# Patient Record
Sex: FEMALE | URBAN - METROPOLITAN AREA
[De-identification: names, ages, dates, MRNs, and addresses within clinical notes are randomized per-mention and may not be internally consistent; named-entity substitution may affect disease eponyms.]

---

## 2021-09-07 NOTE — PROGRESS NOTES
AMBULATORY PROGRESS NOTE      Patient: Efrem Arellano             MRN: [id number]     SSN: [id number] Body mass index is 30.18 kg/m². YOB: 1980     AGE: 35 y.o. EX: female    PCP: Qing Peralta MD       IMPRESSION //  DIAGNOSIS AND TREATMENT PLAN        Sherron Arellano has a diagnosis of:      She has had distal tibia stress fracture, left side: Nondisplaced nonangulated: The MRI, shows some cortical edema in the posterior focal region of the posterior cortex. She has been weightbearing as tolerated in a short cam walker boot, ice her care is initially created by a local podiatrist.  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 Recommendation, is to use the cam walker boot, I still think the height of the short boot, still adequate, for her fracture. While at work, she is in use her discretion to stand and walk and it was written to use her discretion to sit down when needed, as she is using an ASO ankle sleeve and this has been helping her. Recommend CMP check calcium as well as vitamin D level. I think the running, was the primary factor of her developing the stress fracture, as she in the past and more recently up until she had severe pain and could not run anymore, she ran upwards to 2 to 10 miles a week, but on occasion 5 miles on one session but on alternate days. Explained of these fractures usually take about 6 to 8 weeks to heal, immediate protect her for that period of time. Plan to see her, in 3 weeks, obtain x-rays, of her left tib-fib, attention left distal tibia region specifically. DIAGNOSES    1.  Stress fracture of left tibia, initial encounter        Orders Placed This Encounter    METABOLIC PANEL, COMPREHENSIVE     Standing Status:   Future     Standing Expiration Date:   10/8/2021    VITAMIN D, 25 HYDROXY     Standing Status:   Future     Standing Expiration Date:   9/9/2022    norethindrone-ethinyl estradiol (Loestrin Fe 1/20, 28-Day,) 1 mg-20 mcg (21)/75 mg (7) tab     Sig: Loestrin Fe 1/20 (28-Day)    norethindrone ac-eth estradioL (Microgestin 1.5/30, 21,) 1.5-30 mg-mcg tab     Sig: Microgestin 1.5/30 (21) 1.5 mg-30 mcg tablet    omega-3s/dha/epa/fish oil/D3 (VITAMIN-D + OMEGA-3 PO)     Sig: Vitamin D    ascorbic acid, vitamin C, (ascorbic acid) 100 mg tab     Sig: Vitamin C    levothyroxine (SYNTHROID) 112 mcg tablet     Sig: Take 112 mcg by mouth Daily (before breakfast). PLAN:    1. Continue to modify activities: avoid running for now. 2. Continue wearing L Short CAM walker boot that was previously given to you. 3. Obtain  XR at next OV  4  Order bloodwork: CMP and Vitamin D      RTO-  F/I of bloodwork    Sherron Arellano  expresses understanding of the diagnosis, treatment plan, and all of their proposed questions were answered to their satisfaction. Patient education has been provided re the diagnoses. HPI //  OBJECTIVE EXAMINATION        Sherron Arellano IS A 35 y.o. female who is a/an  new patient, presenting to my outpatient office for evaluation of  the following chief complaint(s):     Chief Complaint   Patient presents with    Leg Pain     left, NKI, had MRI    Ankle Pain     left, NKI, had MRI       Patient presents today w/ lingering left leg/ankle pain. She states initially had left foot pain that began to radiate into her ankle/leg onset July. [facility] and had XRs and MRI completed. [facility] also gave her a short CAM walker boot , which has helped to alleviate most of her left leg/ankle pain. She started running December 2020 and stopped due to left ankle/leg pain. She averaged 2-10 miles over a period of 2-3 days each week. Mentions she runs on a trail and treadmill.     Visit Vitals  Pulse 72   Temp 97.5 °F (36.4 °C) (Skin)   Ht 5' 2\" (1.575 m)   Wt 165 lb (74.8 kg)   LMP 08/25/2021 Comment: on birth control pill   SpO2 96%   BMI 30.18 kg/m²       Appearance: Alert, well appearing and pleasant patient who is in no distress, oriented to person, place/time, and who follows commands. This patient is accompanied in the examination room by her  self. There is signs of: no dementia  Psychiatric: Affect/mood are appropriate. Speech normal in context and clarity, memory intact grossly, no involuntary movements - tremors. Patient arrives to office via: with assistive device: Short CAM walker boot  H EENT (2): Head normocephalic & atraumatic. Eye: pupils are round// EOM are intact // Neck: ROM WNL  // Hearings Intact   Respiratory: Breathing non labored     ANKLE/FOOT left    Gait: uses assistive device ( Short CAM walker boot)  Tenderness: mild to medial face of the tibia, distalward. There is mild tenderness, nonfocal palpation. Cutaneous: Mild swelling focal region anteromedial tibial face. Joint Motion: Ankle, hindfoot, forefoot joints have range of motion that are: WNL  Joint / Tendon Stability: No Ankle or Subtalar instability or joint laxity. No peroneal sublux ability or dislocation  Alignment: neutral Hindfoot,    Neuro Motor/Sensory: NL/NL  Vascular: NL foot/ankle pulses,   Lymphatics: No extremity lymphedema, No calf swelling, no tenderness to calf muscles. CHART REVIEW     Sherron Arellano has been experiencing pain and discomfort confirmed as outlined in the pain assessment outlined below.  was reviewed by Mark Avina MD on 9/8/2021. Pain Assessment  9/8/2021   Location of Pain Ankle;Leg   Location Modifiers Left   Severity of Pain 2   Quality of Pain Aching; Sharp   Duration of Pain A few minutes   Frequency of Pain Intermittent   Aggravating Factors Other (Comment)   Aggravating Factors Comment daily activity   Limiting Behavior Yes   Relieving Factors Other (Comment)   Relieving Factors Comment boot, motrin and tylenol   Result of Injury No        Sherron Arellano has a past medical history of Hypothyroid, Left ankle pain, and Left leg pain. Patients is employed at:         Past Medical History:   Diagnosis Date    Hypothyroid     hx cancer    Left ankle pain     Left leg pain      Past Surgical History:   Procedure Laterality Date    HX THYROIDECTOMY       Current Outpatient Medications   Medication Sig    norethindrone-ethinyl estradiol (Loestrin Fe 1/20, 28-Day,) 1 mg-20 mcg (21)/75 mg (7) tab Loestrin Fe 1/20 (28-Day)    norethindrone ac-eth estradioL (Microgestin 1.5/30, 21,) 1.5-30 mg-mcg tab Microgestin 1.5/30 (21) 1.5 mg-30 mcg tablet    omega-3s/dha/epa/fish oil/D3 (VITAMIN-D + OMEGA-3 PO) Vitamin D    ascorbic acid, vitamin C, (ascorbic acid) 100 mg tab Vitamin C    levothyroxine (SYNTHROID) 112 mcg tablet Take 112 mcg by mouth Daily (before breakfast). No current facility-administered medications for this visit. No Known Allergies  Social History     Occupational History    Not on file   Tobacco Use    Smoking status: Never Smoker    Smokeless tobacco: Never Used   Substance and Sexual Activity    Alcohol use: Not on file    Drug use: Not on file    Sexual activity: Yes     Partners: Male     Birth control/protection: Pill     History reviewed. No pertinent family history. DIAGNOSTIC LAB DATA      No results found for: HBA1C, WLO3LUQL, XUR3ZWGB // No results found for: GLU, GLUCPOC     No results found for: WYH9XASR, NUX4NVCM      No results found for: VITD3, XQVID2, XQVID3, XQVID, VD3RIA, [id number]      REVIEW OF SYSTEMS : 9/8/2021  ALL BELOW ARE Negative except : SEE HPI     All other systems reviewed and are negative. 12 point review of systems otherwise negative unless noted in HPI.       DIAGNOSTIC IMAGING /ORDERS       Orders Placed This Encounter    METABOLIC PANEL, COMPREHENSIVE     Standing Status:   Future     Standing Expiration Date:   10/8/2021    VITAMIN D, 25 HYDROXY     Standing Status:   Future     Standing Expiration Date:   9/9/2022    norethindrone-ethinyl estradiol (Loestrin Fe 1/20, 28-Day,) 1 mg-20 mcg (21)/75 mg (7) tab     Sig: Loestrin Fe 1/20 (28-Day)    norethindrone ac-eth estradioL (Microgestin 1.5/30, 21,) 1.5-30 mg-mcg tab     Sig: Microgestin 1.5/30 (21) 1.5 mg-30 mcg tablet    omega-3s/dha/epa/fish oil/D3 (VITAMIN-D + OMEGA-3 PO)     Sig: Vitamin D    ascorbic acid, vitamin C, (ascorbic acid) 100 mg tab     Sig: Vitamin C    levothyroxine (SYNTHROID) 112 mcg tablet     Sig: Take 112 mcg by mouth Daily (before breakfast). [facility] IMAGING : INTERPRETATION BY RADIOLOGIST     MR ANKLE WO + W IV CON LEFT    Impression      1. Deep to the marker indicating site of clinical concern is a nondisplaced subacute appearing stress fracture of the distal tibial diaphysis with associated periostitis and edema/enhancement as above. 2. Mild nonspecific patchy subcutaneous edema anteriorly overlying the anterior tibialis tendon. Signed By: Layton Ruiz MD on 8/30/2021 11:07 AM  Narrative    EXAMINATION: MR ANKLE WO + W IV CON LEFT     CLINICAL INDICATION/HISTORY: V72.994: Anterior tibialis tendonitis of left leg   M79.672: Pain in left foot    >Additional: None     TECHNIQUE sagittal T1 and STIR, axial proton-density and T2 with fat saturation, proton density axial oblique as well as coronal T2 with fat saturation. Additional T1 axial precontrast followed by postcontrast T1 fat-saturated stenosis in all 3 planes. COMPARISON EXAMS: Left ankle radiographs dated 7/13/2021     _______________     FINDINGS:     ANKLE LIGAMENTS:   Anterior and posterior inferior tibiofibular ligaments and syndesmosis: Intact   Anterior and posterior talofibular and the calcaneofibular ligaments: Intact   Deltoid and spring ligaments: Intact     ANKLE AND FOOT TENDONS:   Lateral: Peroneus longus and brevis tendons: Intact and normal in signal intensity.      Medial: Posterior tibialis, flexor digitorum longus and flexor hallucis longus tendons: Intact and normal in signal intensity. Extensor tendons: Intact and normal in signal intensity. Mild peritendinous edema is seen in the subcutaneous soft tissues overlying the anterior tibialis tendon. Achilles tendon: Achilles tendon is normal in morphology and signal intensity.       Kager's fat pad is unremarkable.  No retrocalcaneal bursitis. OSSEOUS:     Lower leg: There is focal cortical thickening with enhancing periostitis and a linear band of increased signal traversing the posterior cortex of the distal tibia (sagittal series images 11-13 and axial series images 31-34). This corresponds to the marker indicating site of clinical concern. Mild subjacent marrow edema. Tibiotalar and subtalar joints: No joint effusion. No fracture or contusion. Intact talar dome and symmetric ankle mortise. No degenerative osteoarthropathy of the ankle. Included midfoot articulations: Unremarkable. OTHER:   Sinus tarsi: Normal fat signal intensity. Plantar fascia: Intact, normal in caliber and signal intensity. Tarsal tunnel: Normal in appearance. Regional soft tissues: No abnormal soft tissue fluid collections. _______________  Other Result Text    Jl Alexandra MD - 08/30/2021   Formatting of this note might be different from the original.   EXAMINATION: MR ANKLE WO + W IV CON LEFT     CLINICAL INDICATION/HISTORY: X44.415: Anterior tibialis tendonitis of left leg   M79.672: Pain in left foot    >Additional: None     TECHNIQUE sagittal T1 and STIR, axial proton-density and T2 with fat saturation, proton density axial oblique as well as coronal T2 with fat saturation. Additional T1 axial precontrast followed by postcontrast T1 fat-saturated stenosis in all 3 planes.      COMPARISON EXAMS: Left ankle radiographs dated 7/13/2021     _______________     FINDINGS:     ANKLE LIGAMENTS:   Anterior and posterior inferior tibiofibular ligaments and syndesmosis: Intact   Anterior and posterior talofibular and the calcaneofibular ligaments: Intact   Deltoid and spring ligaments: Intact     ANKLE AND FOOT TENDONS:   Lateral: Peroneus longus and brevis tendons: Intact and normal in signal intensity. Medial: Posterior tibialis, flexor digitorum longus and flexor hallucis longus tendons: Intact and normal in signal intensity. Extensor tendons: Intact and normal in signal intensity. Mild peritendinous edema is seen in the subcutaneous soft tissues overlying the anterior tibialis tendon. Achilles tendon: Achilles tendon is normal in morphology and signal intensity.       Kager's fat pad is unremarkable.  No retrocalcaneal bursitis. OSSEOUS:     Lower leg: There is focal cortical thickening with enhancing periostitis and a linear band of increased signal traversing the posterior cortex of the distal tibia (sagittal series images 11-13 and axial series images 31-34). This corresponds to the marker indicating site of clinical concern. Mild subjacent marrow edema. Tibiotalar and subtalar joints: No joint effusion. No fracture or contusion. Intact talar dome and symmetric ankle mortise. No degenerative osteoarthropathy of the ankle. Included midfoot articulations: Unremarkable. OTHER:   Sinus tarsi: Normal fat signal intensity. Plantar fascia: Intact, normal in caliber and signal intensity. Tarsal tunnel: Normal in appearance. Regional soft tissues: No abnormal soft tissue fluid collections. _______________     IMPRESSION     1. Deep to the marker indicating site of clinical concern is a nondisplaced subacute appearing stress fracture of the distal tibial diaphysis with associated periostitis and edema/enhancement as above. 2. Mild nonspecific patchy subcutaneous edema anteriorly overlying the anterior tibialis tendon.      Signed By: Vicki Garcia MD on 8/30/2021 11:07 AM     Date: 08/30/21   Received From: [address] [place] [address] 2 VIEWS LEFT    Narrative    3 views of the left foot were taken and results reviewed with the Pt.       Interpretation: There is no evidence of acute pathology present. No   evidence of fracture or foreign body. There are degenerative changes in   the forefoot/midfoot/rearfoot. Calcaneal spurring.       Impression: No acute pathology as noted above. 2 views of the left ankle were taken and results reviewed with the Pt.       Interpretation: There is no evidence of acute pathology present. No   evidence of fracture or foreign body. There are degenerative changes in   the forefoot/midfoot/rearfoot. Calcaneal spurring.       Impression: No acute pathology as noted above. Other Result Text    This result has an attachment that is not available. Date: 07/14/21   Received From: [address] [place] [address] 3 VIEWS LEFT    Narrative    3 views of the left foot were taken and results reviewed with the Pt.       Interpretation: There is no evidence of acute pathology present. No   evidence of fracture or foreign body. There are degenerative changes in   the forefoot/midfoot/rearfoot. Calcaneal spurring.       Impression: No acute pathology as noted above. 2 views of the left ankle were taken and results reviewed with the Pt.       Interpretation: There is no evidence of acute pathology present. No   evidence of fracture or foreign body. There are degenerative changes in   the forefoot/midfoot/rearfoot. Calcaneal spurring.       Impression: No acute pathology as noted above. Other Result Text    This result has an attachment that is not available. Date: 07/14/21   Received From: [address]           I have reviewed the radiology transcribed results and I have reviewed the images of the above study.                 On this date 09/08/2021 I have spent 30 minutes reviewing previous notes, test results and face to face with the patient discussing the diagnosis and importance of compliance with the treatment plan as well as documenting on the day of the visit. An electronic signature was used to authenticate this note. Disclaimer: Sections of this note are dictated using utilizing voice recognition software, which may have resulted in some phonetic based errors in grammar and contents. Even though attempts were made to correct all the mistakes, some may have been missed, and remained in the body of the document. If questions arise, please contact our department. Sherron Arellano may have a reminder for a \"due or due soon\" health maintenance. I have asked that she contact her primary care provider for follow-up on this health maintenance.       9/8/2021  8:48 AM

## 2021-09-08 ENCOUNTER — OFFICE VISIT (OUTPATIENT)
Dept: ORTHOPEDIC SURGERY | Age: 41
End: 2021-09-08
Payer: COMMERCIAL

## 2021-09-08 VITALS
WEIGHT: 165 LBS | HEIGHT: 62 IN | OXYGEN SATURATION: 96 % | BODY MASS INDEX: 30.36 KG/M2 | HEART RATE: 72 BPM | TEMPERATURE: 97.5 F

## 2021-09-08 DIAGNOSIS — M84.362A STRESS FRACTURE OF LEFT TIBIA, INITIAL ENCOUNTER: Primary | ICD-10-CM

## 2021-09-08 PROCEDURE — 99203 OFFICE O/P NEW LOW 30 MIN: CPT | Performed by: ORTHOPAEDIC SURGERY

## 2021-09-08 PROCEDURE — 27760 CLTX MEDIAL ANKLE FX: CPT | Performed by: ORTHOPAEDIC SURGERY

## 2021-09-08 RX ORDER — LEVOTHYROXINE SODIUM 112 UG/1
112 TABLET ORAL
COMMUNITY

## 2021-09-08 RX ORDER — NORETHINDRONE ACETATE AND ETHINYL ESTRADIOL .03; 1.5 MG/1; MG/1
TABLET ORAL
COMMUNITY

## 2021-09-08 RX ORDER — NORETHINDRONE ACETATE AND ETHINYL ESTRADIOL 1MG-20(21)
KIT ORAL
COMMUNITY

## 2021-09-08 NOTE — LETTER
NOTIFICATION RETURN TO WORK / SCHOOL    9/8/2021 11:47 AM    Ms. Sherron Arellano  [address]      To Whom It May Concern:    Sherron Arellano is currently under the care of [address] [place] [address] Srinath Carrillo. Please allow Sherron Arellano to her discretion to sit at work as she is still recovering from a left distal tibia stress fracture. If there are questions or concerns please have the patient contact our office.         Sincerely,      Michael Garcia MD

## 2021-09-28 NOTE — PROGRESS NOTES
AMBULATORY PROGRESS NOTE      Patient: Charly Arellano             MRN: [id number]     SSN: [id number] Body mass index is 30 kg/m². YOB: 1980     AGE: 35 y.o. EX: female    PCP: Marvel Dumont MD       IMPRESSION //  DIAGNOSIS AND TREATMENT PLAN        Sherron Arellano has a diagnosis of:      She is getting better, overall. As such, I weaned her in the cam walker boot, and have her just use an air sport Aircast brace. Have her walk for exercise for a month, and then walk run intermittently, thereafter. It is to be recalled that she had a distal tibia stress fracture, confirmed on MRI. DIAGNOSES    1. Stress fracture of left tibia, initial encounter        No orders of the defined types were placed in this encounter. PLAN:    1. Discontinue wearing L Short CAM walker boot  2. Advised pt to resume running in 1 month. RTO-  1 month    Sherron Arellano  expresses understanding of the diagnosis, treatment plan, and all of their proposed questions were answered to their satisfaction. Patient education has been provided re the diagnoses. HPI //  OBJECTIVE EXAMINATION        Sherron Arellano IS A 35 y.o. female who is a/an  established patient, presenting to my outpatient office for evaluation of  the following chief complaint(s):     Chief Complaint   Patient presents with    Foot Pain     left foot       At LOV pt presented w/ left ankle  and left leg pain. Continue to modify activities: avoid running for now. Continue wearing L Short CAM walker boot that was previously given to you. Obtain  XR at next OV   Order bloodwork: CMP and Vitamin D. Since LOV pt presents today w/ less left foot pain. She is wearing a  L Short CAM walker boot.     Visit Vitals  Pulse 74   Temp 97.5 °F (36.4 °C) (Temporal)   Ht 5' 2\" (1.575 m)   Wt 164 lb (74.4 kg)   SpO2 98%   BMI 30.00 kg/m²       Appearance: Alert, well appearing and pleasant patient who is in no distress, oriented to person, place/time, and who follows commands. This patient is accompanied in the examination room by her  self. There is signs of: no dementia  Psychiatric: Affect/mood are appropriate. Speech normal in context and clarity, memory intact grossly, no involuntary movements - tremors. Patient arrives to office via: with assistive device: L Short CAM walker boot  H EENT (2): Head normocephalic & atraumatic. Eye: pupils are round// EOM are intact // Neck: ROM WNL  // Hearings Intact   Respiratory: Breathing non labored     ANKLE/FOOT left    Gait: uses assistive device L CAM walker boot  Tenderness: no     To the ankle, hindfoot, at this current time. None to the distal tibia, inner calf. No signs of DVT. Cutaneous:   WNL. Joint Motion:   WNL  Joint / Tendon Stability: No Ankle or Subtalar instability or joint laxity. No peroneal sublux ability or dislocation  Alignment: neutral Hindfoot,    Neuro Motor/Sensory: NL/NL  Vascular: NL foot/ankle pulses,   Lymphatics: No extremity lymphedema, No calf swelling, no tenderness to calf muscles. CHART REVIEW     Sherron Arellano has been experiencing pain and discomfort confirmed as outlined in the pain assessment outlined below.  was reviewed by Isa Johnson MD on 9/29/2021. Pain Assessment  9/29/2021   Location of Pain Foot   Location Modifiers Left   Severity of Pain 0   Quality of Pain -   Duration of Pain -   Frequency of Pain -   Aggravating Factors -   Aggravating Factors Comment -   Limiting Behavior -   Relieving Factors -   Relieving Factors Comment -   Result of Injury -        Sherron Arellano  has a past medical history of Hypothyroid, Left ankle pain, and Left leg pain.      Patients is employed at:         Past Medical History:   Diagnosis Date    Hypothyroid     hx cancer    Left ankle pain     Left leg pain      Past Surgical History:   Procedure Laterality Date    HX THYROIDECTOMY       Current Outpatient Medications Medication Sig    norethindrone-ethinyl estradiol (Loestrin Fe 1/20, 28-Day,) 1 mg-20 mcg (21)/75 mg (7) tab Loestrin Fe 1/20 (28-Day)    norethindrone ac-eth estradioL (Microgestin 1.5/30, 21,) 1.5-30 mg-mcg tab Microgestin 1.5/30 (21) 1.5 mg-30 mcg tablet    omega-3s/dha/epa/fish oil/D3 (VITAMIN-D + OMEGA-3 PO) Vitamin D    ascorbic acid, vitamin C, (ascorbic acid) 100 mg tab Vitamin C    levothyroxine (SYNTHROID) 112 mcg tablet Take 112 mcg by mouth Daily (before breakfast). No current facility-administered medications for this visit. No Known Allergies  Social History     Occupational History    Not on file   Tobacco Use    Smoking status: Never Smoker    Smokeless tobacco: Never Used   Substance and Sexual Activity    Alcohol use: Not on file    Drug use: Not on file    Sexual activity: Yes     Partners: Male     Birth control/protection: Pill     History reviewed. No pertinent family history. DIAGNOSTIC LAB DATA      No results found for: HBA1C, PWD0HZIA, STZ9DCYT // No results found for: GLU, GLUCPOC     No results found for: EPZ9MMAA, COA8OPCM      No results found for: VITD3, XQVID2, XQVID3, XQVID, VD3RIA, [id number]      REVIEW OF SYSTEMS : 9/29/2021  ALL BELOW ARE Negative except : SEE HPI     All other systems reviewed and are negative. 12 point review of systems otherwise negative unless noted in HPI. DIAGNOSTIC IMAGING /ORDERS       No orders of the defined types were placed in this encounter. MRI Results (most recent):  No results found for this or any previous visit. I have reviewed the results of the above study. The interpretation of this study is my professional opinion. On this date 09/29/2021 I have spent 20 minutes reviewing previous notes, test results and face to face with the patient discussing the diagnosis and importance of compliance with the treatment plan as well as documenting on the day of the visit.     An electronic signature was used to authenticate this note. Disclaimer: Sections of this note are dictated using utilizing voice recognition software, which may have resulted in some phonetic based errors in grammar and contents. Even though attempts were made to correct all the mistakes, some may have been missed, and remained in the body of the document. If questions arise, please contact our department. Sherron Arellano may have a reminder for a \"due or due soon\" health maintenance. I have asked that she contact her primary care provider for follow-up on this health maintenance.     Barbara Paredes, as dictated by, Berna Grijalva  9/29/2021  8:11 AM

## 2021-09-29 ENCOUNTER — OFFICE VISIT (OUTPATIENT)
Dept: ORTHOPEDIC SURGERY | Age: 41
End: 2021-09-29
Payer: COMMERCIAL

## 2021-09-29 VITALS
BODY MASS INDEX: 30.18 KG/M2 | WEIGHT: 164 LBS | HEART RATE: 74 BPM | OXYGEN SATURATION: 98 % | TEMPERATURE: 97.5 F | HEIGHT: 62 IN

## 2021-09-29 DIAGNOSIS — M84.362A STRESS FRACTURE OF LEFT TIBIA, INITIAL ENCOUNTER: Primary | ICD-10-CM

## 2021-09-29 PROCEDURE — 99024 POSTOP FOLLOW-UP VISIT: CPT | Performed by: ORTHOPAEDIC SURGERY

## 2021-10-26 NOTE — PROGRESS NOTES
AMBULATORY PROGRESS NOTE      Patient: Cecilia Arellano             MRN: [id number]     SSN: [id number] Body mass index is 30.25 kg/m². YOB: 1980     AGE: 35 y.o. EX: female    PCP: Pacheco Ontiveros MD       IMPRESSION //  DIAGNOSIS AND TREATMENT PLAN        Sherron Arellano has a diagnosis of:        DIAGNOSES    1. Stress fracture of left tibia, initial encounter        No orders of the defined types were placed in this encounter. PLAN:    1. Gradually work up to running regularly w/ walk/run method       RTO : 6 weeks    There are no Patient Instructions on file for this visit. Please follow up with your PCP for any health maintenance as recommended         Sherron Arellano  expresses understanding of the diagnosis, treatment plan, and all of their proposed questions were answered to their satisfaction. Patient education has been provided re the diagnoses. HPI //  OBJECTIVE EXAMINATION        Sherron Arellano IS A 35 y.o. female who is a/an  established patient, presenting to my outpatient office for evaluation of  the following chief complaint(s):     Chief Complaint   Patient presents with    Ankle Pain     Left    Foot Pain     Left       At LOV Sherron Arellano presented w/ left foot pain. Discontinue wearing L [employer] CAM walker boot. Advised pt to resume running in 1 month.     Since LOV Sherron Arellano presents today w/ no left foot pain. She mentions she hasn't had any left foot pain since the LOV. She mentions she was able torun 5 minutes this morning w/o pain. Visit Vitals  Pulse 74   Temp 97.3 °F (36.3 °C)   Ht 5' 2\" (1.575 m)   Wt 165 lb 6.4 oz (75 kg)   SpO2 98%   BMI 30.25 kg/m²       Appearance: Alert, well appearing and pleasant patient who is in no distress, oriented to person, place/time, and who follows commands. Normal dress/motor activity/thought processes/memory. This patient is accompanied in the examination room by her  self.  There is signs of: no dementia  Patient arrives to office via: without assistive device:    Psychiatric:  Normal Affect/mood. Judgement, behavior, and conduct are appropriate. Speech normal in context and clarity, memory intact grossly, no involuntary movements - tremors. H EENT (2): Head normocephalic & atraumatic. Eye: pupils are round// EOM are intact // Neck: ROM WNL  // Hearings Intact   Respiratory: Breathing non labored     ANKLE/FOOT left    Gait: normal  Tenderness: no     Cutaneous:   WNL. Joint Motion:   WNL    Joint / Tendon Stability: No Ankle or Subtalar instability or joint laxity. No peroneal sublux ability or dislocation  Alignment: neutral Hindfoot,    Neuro Motor/Sensory: NL/NL  Vascular: NL foot/ankle pulses,   Lymphatics: No extremity lymphedema, No calf swelling, no tenderness to calf muscles. CHART REVIEW     Sherron Arellano has been experiencing pain and discomfort confirmed as outlined in the pain assessment outlined below.  was reviewed by Fang Allen MD on 10/27/2021. Pain Assessment  9/29/2021   Location of Pain Foot   Location Modifiers Left   Severity of Pain 0   Quality of Pain -   Duration of Pain -   Frequency of Pain -   Aggravating Factors -   Aggravating Factors Comment -   Limiting Behavior -   Relieving Factors -   Relieving Factors Comment -   Result of Injury -        Sherron Arellano  has a past medical history of Hypothyroid, Left ankle pain, and Left leg pain.      Patients is employed at:         Past Medical History:   Diagnosis Date    Hypothyroid     hx cancer    Left ankle pain     Left leg pain      Past Surgical History:   Procedure Laterality Date    HX THYROIDECTOMY       Current Outpatient Medications   Medication Sig    norethindrone-ethinyl estradiol (Loestrin Fe 1/20, 28-Day,) 1 mg-20 mcg (21)/75 mg (7) tab Loestrin Fe 1/20 (28-Day)    norethindrone ac-eth estradioL (Microgestin 1.5/30, 21,) 1.5-30 mg-mcg tab Microgestin 1.5/30 (21) 1.5 mg-30 mcg tablet    omega-3s/dha/epa/fish oil/D3 (VITAMIN-D + OMEGA-3 PO) Vitamin D    ascorbic acid, vitamin C, (ascorbic acid) 100 mg tab Vitamin C    levothyroxine (SYNTHROID) 112 mcg tablet Take 112 mcg by mouth Daily (before breakfast). No current facility-administered medications for this visit. No Known Allergies  Social History     Occupational History    Not on file   Tobacco Use    Smoking status: Never Smoker    Smokeless tobacco: Never Used   Substance and Sexual Activity    Alcohol use: Not on file    Drug use: Not on file    Sexual activity: Yes     Partners: Male     Birth control/protection: Pill     History reviewed. No pertinent family history. DIAGNOSTIC LAB DATA      No results found for: HBA1C, PBK6RZLH, GCM4VBGZ // No results found for: GLU, GLUCPOC     No results found for: NKF0ICHS, SEC5KJMB      No results found for: VITD3, XQVID2, XQVID3, XQVID, VD3RIA, QJMF41QNBUR     Drug Screen Most Recent Result Date    No resulted procedures found. REVIEW OF SYSTEMS : 10/27/2021  ALL BELOW ARE Negative except : SEE HPI     All other systems reviewed and are negative. 12 point review of systems otherwise negative unless noted in HPI. RADIOGRAPHS// IMAGING//DIAGNOSTIC DATA       [facility] : INTERPRETATION BY RADIOLOGIST      MR ANKLE WO + W IV CON LEFT     Impression       1. Deep to the marker indicating site of clinical concern is a nondisplaced subacute appearing stress fracture of the distal tibial diaphysis with associated periostitis and edema/enhancement as above. 2. Mild nonspecific patchy subcutaneous edema anteriorly overlying the anterior tibialis tendon.      Signed By: Dora Donahue MD on 8/30/2021 11:07 AM  Narrative     EXAMINATION: MR ANKLE WO + W IV CON LEFT     CLINICAL INDICATION/HISTORY: Y09.975: Anterior tibialis tendonitis of left leg   M79.672: Pain in left foot    >Additional: None     TECHNIQUE sagittal T1 and STIR, axial proton-density and T2 with fat saturation, proton density axial oblique as well as coronal T2 with fat saturation. Additional T1 axial precontrast followed by postcontrast T1 fat-saturated stenosis in all 3 planes. COMPARISON EXAMS: Left ankle radiographs dated 7/13/2021     _______________     FINDINGS:     ANKLE LIGAMENTS:   Anterior and posterior inferior tibiofibular ligaments and syndesmosis: Intact   Anterior and posterior talofibular and the calcaneofibular ligaments: Intact   Deltoid and spring ligaments: Intact     ANKLE AND FOOT TENDONS:   Lateral: Peroneus longus and brevis tendons: Intact and normal in signal intensity. Medial: Posterior tibialis, flexor digitorum longus and flexor hallucis longus tendons: Intact and normal in signal intensity. Extensor tendons: Intact and normal in signal intensity. Mild peritendinous edema is seen in the subcutaneous soft tissues overlying the anterior tibialis tendon. Achilles tendon: Achilles tendon is normal in morphology and signal intensity.       Kager's fat pad is unremarkable.  No retrocalcaneal bursitis. OSSEOUS:     Lower leg: There is focal cortical thickening with enhancing periostitis and a linear band of increased signal traversing the posterior cortex of the distal tibia (sagittal series images 11-13 and axial series images 31-34). This corresponds to the marker indicating site of clinical concern. Mild subjacent marrow edema. Tibiotalar and subtalar joints: No joint effusion. No fracture or contusion. Intact talar dome and symmetric ankle mortise. No degenerative osteoarthropathy of the ankle. Included midfoot articulations: Unremarkable. OTHER:   Sinus tarsi: Normal fat signal intensity. Plantar fascia: Intact, normal in caliber and signal intensity. Tarsal tunnel: Normal in appearance. Regional soft tissues: No abnormal soft tissue fluid collections.      _______________  Other Result Text     Joelle Celaya MD - 08/30/2021   Formatting of this note might be different from the original.   EXAMINATION: MR ANKLE WO + W IV CON LEFT     CLINICAL INDICATION/HISTORY: D37.160: Anterior tibialis tendonitis of left leg   M79.672: Pain in left foot    >Additional: None     TECHNIQUE sagittal T1 and STIR, axial proton-density and T2 with fat saturation, proton density axial oblique as well as coronal T2 with fat saturation. Additional T1 axial precontrast followed by postcontrast T1 fat-saturated stenosis in all 3 planes. COMPARISON EXAMS: Left ankle radiographs dated 7/13/2021     _______________     FINDINGS:     ANKLE LIGAMENTS:   Anterior and posterior inferior tibiofibular ligaments and syndesmosis: Intact   Anterior and posterior talofibular and the calcaneofibular ligaments: Intact   Deltoid and spring ligaments: Intact     ANKLE AND FOOT TENDONS:   Lateral: Peroneus longus and brevis tendons: Intact and normal in signal intensity. Medial: Posterior tibialis, flexor digitorum longus and flexor hallucis longus tendons: Intact and normal in signal intensity. Extensor tendons: Intact and normal in signal intensity. Mild peritendinous edema is seen in the subcutaneous soft tissues overlying the anterior tibialis tendon. Achilles tendon: Achilles tendon is normal in morphology and signal intensity.       Kager's fat pad is unremarkable.  No retrocalcaneal bursitis. OSSEOUS:     Lower leg: There is focal cortical thickening with enhancing periostitis and a linear band of increased signal traversing the posterior cortex of the distal tibia (sagittal series images 11-13 and axial series images 31-34). This corresponds to the marker indicating site of clinical concern. Mild subjacent marrow edema. Tibiotalar and subtalar joints: No joint effusion. No fracture or contusion. Intact talar dome and symmetric ankle mortise. No degenerative osteoarthropathy of the ankle.      Included midfoot articulations: Unremarkable. OTHER:   Sinus tarsi: Normal fat signal intensity. Plantar fascia: Intact, normal in caliber and signal intensity. Tarsal tunnel: Normal in appearance. Regional soft tissues: No abnormal soft tissue fluid collections. _______________     IMPRESSION     1. Deep to the marker indicating site of clinical concern is a nondisplaced subacute appearing stress fracture of the distal tibial diaphysis with associated periostitis and edema/enhancement as above. 2. Mild nonspecific patchy subcutaneous edema anteriorly overlying the anterior tibialis tendon. Signed By: Hyacinth Hwang MD on 8/30/2021 11:07 AM      Date: 08/30/21   Received From: [address]           I have reviewed the radiology transcribed results and I have reviewed the images of the above study. On this date 10/27/2021 I have spent 15 minutes reviewing previous notes, test results and face to face with the patient discussing the diagnosis and importance of compliance with the treatment plan as well as documenting on the day of the visit. Disclaimer:     Sections of this note are dictated using utilizing voice recognition software, which may have resulted in some phonetic based errors in grammar and contents. Even though attempts were made to correct all the mistakes, some may have been missed, and remained in the body of the document. If questions arise, please contact our department. An electronic signature was used to authenticate this note. Sherron Arellano may have a reminder for a \"due or due soon\" health maintenance. I have asked that she contact her primary care provider for follow-up on this health maintenance. There are no Patient Instructions on file for this visit. Please follow up with your PCP for any health maintenance as recommended         Jessica Bruno, as dictated byHuy.   10/27/2021  1:38 PM

## 2021-10-27 ENCOUNTER — OFFICE VISIT (OUTPATIENT)
Dept: ORTHOPEDIC SURGERY | Age: 41
End: 2021-10-27
Payer: COMMERCIAL

## 2021-10-27 VITALS
OXYGEN SATURATION: 98 % | HEIGHT: 62 IN | HEART RATE: 74 BPM | WEIGHT: 165.4 LBS | TEMPERATURE: 97.3 F | BODY MASS INDEX: 30.44 KG/M2

## 2021-10-27 DIAGNOSIS — M84.362A STRESS FRACTURE OF LEFT TIBIA, INITIAL ENCOUNTER: Primary | ICD-10-CM

## 2021-10-27 PROCEDURE — 99213 OFFICE O/P EST LOW 20 MIN: CPT | Performed by: ORTHOPAEDIC SURGERY

## 2022-01-12 ENCOUNTER — VIRTUAL VISIT (OUTPATIENT)
Dept: ORTHOPEDIC SURGERY | Age: 42
End: 2022-01-12
Payer: COMMERCIAL

## 2022-01-12 DIAGNOSIS — M84.362D STRESS FRACTURE OF LEFT TIBIA WITH ROUTINE HEALING, SUBSEQUENT ENCOUNTER: Primary | ICD-10-CM

## 2022-01-12 PROCEDURE — 99442 PR PHYS/QHP TELEPHONE EVALUATION 11-20 MIN: CPT | Performed by: ORTHOPAEDIC SURGERY

## 2022-01-12 NOTE — PROGRESS NOTES
Patient: Chris Arellano             MRN: [id number]     SSN: [id number] There is no height or weight on file to calculate BMI. YOB: 1980     AGE: 35 y.o. EX: female    PCP: Armin Robb MD     VIRTUAL VISIT      Sherron Arellano is a 35 y.o. female who was seen by   audio technology on 1/12/2022. via phone. This virtual Telehealth visit was performed while I was located at : [facility], and Sherron Arellano was located at her home at 4:18 PM, 1/12/2022. Services were provided as a substitute for in-person clinic visit. Virtual time  OV/Chart Review was[de-identified]  4:10 pm  to 4:18 PM  of 1/12/2022     Consent:@ and/or the patient's healthcare decision maker is aware that this patient-initiated Telehealth encounter is a billable service, with coverage as determined by their insurance carrier. The patient is aware that they may receive a bill and has provided verbal consent to proceed. Disclaimer: Sections of this note are dictated using utilizing voice recognition software, which may have resulted in some phonetic based errors in grammar and contents. Even though attempts were made to correct all the mistakes, some may have been missed, and remained in the body of the document. If questions arise, please contact our department. ASSESSMENT        ICD-10-CM ICD-9-CM    1. Stress fracture of left tibia with routine healing, subsequent encounter  M84.362D V54.89         No orders of the defined types were placed in this encounter.         PLAN       As needed RTO, as patient is doing well:         CPT Codes 86614-15778 for Established Patients may apply to this Telehealth Visit  Time-based coding, delete if not needed: I spent at least 5 minutes with this established patient, and >50% of the time was spent counseling and/or coordinating care regarding      Due to this being a TeleHealth evaluation, many elements of the physical examination are unable to be assessed. We discussed the expected course, resolution and complications of the diagnosis(es) in detail. Medication risks, benefits, costs, interactions, and alternatives were discussed as indicated. I advised her to contact the office if her condition worsens, changes or fails to improve as anticipated. She expressed understanding with the diagnosis(es) and plan. Pursuant to the emergency declaration under the [facility] [place] [address] [facility] authority and the [employer] Act, this Virtual  Visit was conducted, with patient's consent, to reduce the patient's risk of exposure to COVID-19 and provide continuity of care for an established patient. SUBJECTIVE      Sherron Arellano was seen for No chief complaint on file. I saw her last, she has resumed running she admits to having no pain or discomfort, and her left ankle left tibia. She began to walk around routine and now has been running. She ran for the first time today, and a continuous fashion, running about 3 miles. Since my last OV with Sherron Arellano, she states that she is doing better overall, is having no pain/discomfort, is standing/walking longer time duration or distances. Sherron Arellano has been seen for : above Dx is listed above     hSerron Arellano is not taking medications for pain relief[de-identified]       Sherron Arellano Denies: CP, SOB, ABD PAIN, Calf pain. JUSTIN TRIAGE QUESTIONNAIRE    Since your last office visit, have you had any:    1. New medical diagnoses No  2. Changes in your medications  No  3. Surgical procedures No  4. Changes in your Allergies to medication No  5. What is your pain level today 0/10  6.  Changes in: PMH, SH, ROS: No          CHART REVIEW     Current Outpatient Medications   Medication Sig    norethindrone-ethinyl estradiol (Loestrin Fe 1/20, 28-Day,) 1 mg-20 mcg (21)/75 mg (7) tab Loestrin Fe 1/20 (28-Day)    norethindrone ac-eth estradioL (Microgestin 1.5/30, 21,) 1.5-30 mg-mcg tab Microgestin 1.5/30 (21) 1.5 mg-30 mcg tablet    omega-3s/dha/epa/fish oil/D3 (VITAMIN-D + OMEGA-3 PO) Vitamin D    ascorbic acid, vitamin C, (ascorbic acid) 100 mg tab Vitamin C    levothyroxine (SYNTHROID) 112 mcg tablet Take 112 mcg by mouth Daily (before breakfast). No current facility-administered medications for this visit. THE  FOR Sherron Cortezs  WAS REVIEWED BY Noel Bruno MD 1/12/2022 . Social History     Occupational History    Not on file   Tobacco Use    Smoking status: Never Smoker    Smokeless tobacco: Never Used   Substance and Sexual Activity    Alcohol use: Not on file    Drug use: Not on file    Sexual activity: Yes     Partners: Male     Birth control/protection: Pill        Prior to Admission medications    Medication Sig Start Date End Date Taking? Authorizing Provider   norethindrone-ethinyl estradiol (Loestrin Fe 1/20, 28-Day,) 1 mg-20 mcg (21)/75 mg (7) tab Loestrin Fe 1/20 (28-Day)    Provider, Historical   norethindrone ac-eth estradioL (Microgestin 1.5/30, 21,) 1.5-30 mg-mcg tab Microgestin 1.5/30 (21) 1.5 mg-30 mcg tablet    Provider, Historical   omega-3s/dha/epa/fish oil/D3 (VITAMIN-D + OMEGA-3 PO) Vitamin D    Provider, Historical   ascorbic acid, vitamin C, (ascorbic acid) 100 mg tab Vitamin C    Provider, Historical   levothyroxine (SYNTHROID) 112 mcg tablet Take 112 mcg by mouth Daily (before breakfast).     Provider, Historical     No Known Allergies     ROS    Past Medical History:   Diagnosis Date    Hypothyroid     hx cancer    Left ankle pain     Left leg pain      Past Surgical History:   Procedure Laterality Date    HX THYROIDECTOMY       Social History     Socioeconomic History    Marital status: SINGLE     Spouse name: Not on file    Number of children: Not on file    Years of education: Not on file    Highest education level: Not on file   Occupational History    Not on file   Tobacco Use    Smoking status: Never Smoker    Smokeless tobacco: Never Used   Substance and Sexual Activity    Alcohol use: Not on file    Drug use: Not on file    Sexual activity: Yes     Partners: Male     Birth control/protection: Pill   Other Topics Concern    Not on file   Social History Narrative    Not on file     Social Determinants of Health     Financial Resource Strain:     Difficulty of Paying Living Expenses: Not on file   Food Insecurity:     Worried About Running Out of Food in the Last Year: Not on file    Liam of Food in the Last Year: Not on file   Transportation Needs:     Lack of Transportation (Medical): Not on file    Lack of Transportation (Non-Medical): Not on file   Physical Activity:     Days of Exercise per Week: Not on file    Minutes of Exercise per Session: Not on file   Stress:     Feeling of Stress : Not on file   Social Connections:     Frequency of Communication with Friends and Family: Not on file    Frequency of Social Gatherings with Friends and Family: Not on file    Attends Mu-ism Services: Not on file    Active Member of [address] [place] [address] or Organizations: Not on file    Attends Club or Organization Meetings: Not on file    Marital Status: Not on file   Intimate Partner Violence:     Fear of Current or Ex-Partner: Not on file    Emotionally Abused: Not on file    Physically Abused: Not on file    Sexually Abused: Not on file   Housing Stability:     Unable to Pay for Housing in the Last Year: Not on file    Number of [place] in the Last Year: Not on file    Unstable Housing in the Last Year: Not on file     History reviewed. No pertinent family history.        OBJECTIVE     General: alert, cooperative, no distress   Mental  status: mental status: alert, oriented to person, place, and time, normal mood, behavior, speech, dress, motor activity, and thought processes   Resp:  no respiratory distress   Neuro: Neuro: see above   Skin: Nl per report:              Elmer Wesley Jus Maurice MD  1/12/2022  4:18 PM

## 2022-07-26 ENCOUNTER — DOCUMENTATION ONLY (OUTPATIENT)
Dept: ORTHOPEDIC SURGERY | Age: 42
End: 2022-07-26

## 2022-07-26 NOTE — PROGRESS NOTES
Patient: Sherron Arellano    YOB: 1980    Date of Visit: 7/26/2022                Etelvina Stockton, [address] [address]  VIA In Basket        To whom it may concern: This letter is to certify that Sherron Arellano is 100% fit for duty. She has healed her left distal tibia stress fracture. If you have questions, please do not hesitate to call me. I look forward to following your patient along with you.     Respectfully    Etelvina Stockton MD  7/26/2022  7:48 PM